# Patient Record
Sex: FEMALE | Race: WHITE | NOT HISPANIC OR LATINO | Employment: UNEMPLOYED | URBAN - METROPOLITAN AREA
[De-identification: names, ages, dates, MRNs, and addresses within clinical notes are randomized per-mention and may not be internally consistent; named-entity substitution may affect disease eponyms.]

---
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
## 2025-04-07 ENCOUNTER — TELEPHONE (OUTPATIENT)
Age: 20
End: 2025-04-07

## 2025-04-07 NOTE — TELEPHONE ENCOUNTER
"Patients mother called in to schedule an appointment with Weight Management. In the process of scheduling an appointment with a Dietician- the call was dropped. Patient qualifies to meet with a dietician-    Height- 5' 6\"  Weight- 207  BMI- 33.4  "

## 2025-04-10 ENCOUNTER — CLINICAL SUPPORT (OUTPATIENT)
Age: 20
End: 2025-04-10

## 2025-04-10 VITALS — HEIGHT: 66 IN | BODY MASS INDEX: 33.4 KG/M2 | WEIGHT: 207.8 LBS

## 2025-04-10 DIAGNOSIS — R63.5 ABNORMAL WEIGHT GAIN: Primary | ICD-10-CM

## 2025-04-10 PROCEDURE — RECHECK

## 2025-04-10 PROCEDURE — WMDI30

## 2025-04-10 NOTE — PROGRESS NOTES
"Weight Management Medical Nutrition Assessment    Cristina presented for a meal planning session. Today's weight is  207.8# . 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 Was playing soccer her entire life and stopped this past summer noticing some weight gain afterwards.   Per dietary recall patient consumes excess calories from frequent dining out, snacking and consumption of ultra processed foods. Also presents with inadequate protein and fiber intake. Open to increasing both protein and fiber intake.  Would like to focus on adding more fiber due to constipation issues and bloating after meals. Open to journaling foods  to possibly identify potential food triggers/trends. Struggles with  emotional eating related to anxiety. Would like to establish a healthy relationship with food. Hydration adequate. Will follow up.       Goals:  1) Start tracking daily protein intake with a goal of 90 grams of protein day   2) Limiting added sugars and limited ginger ale to 1 ginger ale a day  3) Limit syrup pumps to 1.5 to pumps at a time when consuming coffee  4) Add 5 g of fiber to her diet. Focusing on adding a source of fiber at lunch and/or dinner time to help with constipation issues.    Patient seen by Medical Provider in past 6 months:  no  Requested to schedule appointment with Medical Provider: No      Anthropometric Measurements  Start Weight (#) & Date: 207.8# 04/10/2025  Current Weight (#): 207.8#  TBW % Change from start weight:n/a  Ideal Body Weight (#):130#  Goal Weight (#):155# \"feel confident\"   Highest: Current 208#  Lowest: 145# when active in Soccer    Weight Loss History  Previous weight loss attempts: Exercise  Self Created Diets (Portion Control, Healthy Food Choices, etc.)    Food and Nutrition Related History  Wake up: 6:45 am-9:30 am   Bed Time:10:30-11pm    Food Recall- Dining espino twice a week  Breakfast:skip eats around 3 times a week: Bagel with cream " cheese (unmeasured)     Snack:Coffee+ caramel + cream and sugar Medium size from [employer] or [employer]   Lunch:12-1:30 pm: Wrap or sandwich  from [employer], Prepackaged salads + fries occasionally or chips  Snack:fruit  Dinner:5:30-7 pm: Chicken parmesan homemade or stir corley + meat/vegetables + rice/   Snack:Chips or ice cream or carrots and ranch       Beverages: water, soda(ginger ale), Coffee+ caramel + cream and sugar Medium size from [employer] or [employer]   Volume of beverage intake: 1-2 cans gingerale,      Weekends: Same  Cravings: salty- stress eat  Trouble area of day:night    Frequency of Eating out: 7-8 meals/week including dining espino  Food restrictions:n/a  Cooking: self   Food Shopping: self    Physical Activity Intake  Activity:Walking/30 mins-1 hr at a time  Frequency: 3 times a week  Physical limitations/barriers to exercise: n/a    Estimated Needs  Energy  SECA: BMR:n/a      X 1.3 -1000 =  [place] Energy Needs: BMR : 1729   1-2# loss weekly sedentary:  0047-2925             1-2# loss weekly lightly active:3376-7148  Maintenance calories for sedentary activity level: 2075  Protein:71-89 grams      (1.2-1.5g/kg IBW)  Fluid Requirement Calculator   Total Fluid: 101   oz  (Rudy-Segar Method)  Free Fluid: 81 oz (Rudy-Segar Method - 20%)    Nutrition Diagnosis  Yes;    Overweight/obesity  related to Excess energy intake as evidenced by  BMI more than normative standard for age and sex (obesity-grade I 30-34.9)       Nutrition Intervention    Nutrition Prescription  Calories:1300 kcal  Protein:90 grams  Fluid:81 oz    Meal Plan (Devin/Pro/Carb)-Declined at this time. Concerned about potential hyper fixating.   Breakfast:  Snack:  Lunch:  Snack:  Dinner:  Snack:    Nutrition Education:    Calorie controlled menu  Lean protein food choices  Healthy snack options  Food journaling tips      Nutrition Counseling:  Strategies: meal planning, portion sizes, healthy snack choices, hydration, fiber  intake, protein intake, exercise, food journal      Monitoring and Evaluation:  Evaluation criteria:  Energy Intake  Meet protein needs  Maintain adequate hydration  Monitor weekly weight  Meal planning/preparation  Food journal   Decreased portions at mealtimes and snacks  Physical activity     Barriers to learning:none  Readiness to change: Preparation:  (Getting ready to change)   Comprehension: excellent  Expected Compliance: excellent

## (undated) DEVICE — SUT MONOCRYL 2-0 SH 27 IN Y417H

## (undated) DEVICE — ASTOUND STANDARD SURGICAL GOWN, XL: Brand: CONVERTORS

## (undated) DEVICE — INTENDED FOR TISSUE SEPARATION, AND OTHER PROCEDURES THAT REQUIRE A SHARP SURGICAL BLADE TO PUNCTURE OR CUT.: Brand: BARD-PARKER ® CARBON RIB-BACK BLADES

## (undated) DEVICE — PUDDLE VAC

## (undated) DEVICE — 3M™ STERI-STRIP™ REINFORCED ADHESIVE SKIN CLOSURES, R1547, 1/2 IN X 4 IN (12 MM X 100 MM), 6 STRIPS/ENVELOPE: Brand: 3M™ STERI-STRIP™

## (undated) DEVICE — 4-PORT MANIFOLD: Brand: NEPTUNE 2

## (undated) DEVICE — 3M™ DURAPORE™ SURGICAL TAPE 1538-3, 3 INCH X 10 YARD (7,5CM X 9,1M), 4 ROLLS/BOX: Brand: 3M™ DURAPORE™

## (undated) DEVICE — BURR RND 4MM 19CM 8 FLUTE HL COOLCUT

## (undated) DEVICE — GLOVE INDICATOR PI UNDERGLOVE SZ 8.5 BLUE

## (undated) DEVICE — COBAN 6 IN STERILE

## (undated) DEVICE — SURGICAL GOWN, XL SMARTSLEEVE: Brand: CONVERTORS

## (undated) DEVICE — CYSTO TUBING TUR Y IRRIGATION

## (undated) DEVICE — BLADE SHAVER DISSECTOR 4.2MM 19CM HL CRV COOLCUT

## (undated) DEVICE — 3M™ STERI-DRAPE™ U-DRAPE 1015: Brand: STERI-DRAPE™

## (undated) DEVICE — DRAPE C-ARM X-RAY

## (undated) DEVICE — SCD SEQUENTIAL COMPRESSION COMFORT SLEEVE MEDIUM KNEE LENGTH: Brand: KENDALL SCD

## (undated) DEVICE — WEBRIL 6 IN UNSTERILE

## (undated) DEVICE — TUBING SUCTION 5MM X 12 FT

## (undated) DEVICE — KIT HIP ARTHROSCOPY DISP W/BANANA BLADE

## (undated) DEVICE — BETHLEHEM TOTAL HIP, KIT: Brand: CARDINAL HEALTH

## (undated) DEVICE — SYRINGE 20ML LL

## (undated) DEVICE — 6617 IOBAN II PATIENT ISOLATION DRAPE 5/BX,4BX/CS: Brand: STERI-DRAPE™ IOBAN™ 2

## (undated) DEVICE — DRESSING MEPILEX AG BORDER 4 X 8 IN

## (undated) DEVICE — GLOVE SRG BIOGEL 8

## (undated) DEVICE — TUBING EXTENSION 6 FT CONTIN WAVE

## (undated) DEVICE — GLOVE SRG BIOGEL 7.5

## (undated) DEVICE — NEEDLE 18 G X 1 1/2

## (undated) DEVICE — PROBE ABLATION  APOLLO RF 90 DEG MULTI PORT